# Patient Record
Sex: FEMALE | Race: WHITE | NOT HISPANIC OR LATINO | Employment: FULL TIME | URBAN - METROPOLITAN AREA
[De-identification: names, ages, dates, MRNs, and addresses within clinical notes are randomized per-mention and may not be internally consistent; named-entity substitution may affect disease eponyms.]

---

## 2017-10-13 ENCOUNTER — GENERIC CONVERSION - ENCOUNTER (OUTPATIENT)
Dept: OTHER | Facility: OTHER | Age: 47
End: 2017-10-13

## 2018-01-22 VITALS
DIASTOLIC BLOOD PRESSURE: 64 MMHG | SYSTOLIC BLOOD PRESSURE: 96 MMHG | RESPIRATION RATE: 18 BRPM | HEART RATE: 94 BPM | OXYGEN SATURATION: 99 % | TEMPERATURE: 98.7 F

## 2023-06-29 ENCOUNTER — OFFICE VISIT (OUTPATIENT)
Dept: GASTROENTEROLOGY | Facility: CLINIC | Age: 53
End: 2023-06-29
Payer: COMMERCIAL

## 2023-06-29 VITALS
WEIGHT: 138 LBS | OXYGEN SATURATION: 95 % | HEART RATE: 70 BPM | SYSTOLIC BLOOD PRESSURE: 104 MMHG | HEIGHT: 65 IN | BODY MASS INDEX: 22.99 KG/M2 | DIASTOLIC BLOOD PRESSURE: 78 MMHG

## 2023-06-29 DIAGNOSIS — R10.12 LUQ PAIN: ICD-10-CM

## 2023-06-29 DIAGNOSIS — R10.84 GENERALIZED POSTPRANDIAL ABDOMINAL PAIN: Primary | ICD-10-CM

## 2023-06-29 PROCEDURE — 99204 OFFICE O/P NEW MOD 45 MIN: CPT | Performed by: PHYSICIAN ASSISTANT

## 2023-06-29 RX ORDER — OMEPRAZOLE 40 MG/1
40 CAPSULE, DELAYED RELEASE ORAL DAILY
Qty: 30 CAPSULE | Refills: 3 | Status: SHIPPED | OUTPATIENT
Start: 2023-06-29

## 2023-06-29 RX ORDER — ESTRADIOL 0.1 MG/G
CREAM VAGINAL
COMMUNITY
Start: 2023-06-21

## 2023-06-29 RX ORDER — ALPRAZOLAM 0.25 MG/1
0.25 TABLET ORAL DAILY PRN
COMMUNITY
Start: 2023-05-23

## 2023-06-29 NOTE — PROGRESS NOTES
[facility] - Outpatient Consultation  James Farah 52 y o  female MRN: [id number]  Encounter: [id number]          ASSESSMENT AND PLAN:      #1   Recent onset (~ 1 week) of generalized postprandial abdominal pain, and constant left upper quadrant abdominal pain, correlated with nausea, bloating and belching  Broad differential at this point but primary diagnostic considerations at this point are peptic ulcer disease/gastritis with or without H  pylori infection, or gallbladder disease  Functional dyspepsia/IBS with bowel spasm are also in the differential    -We will check stool H  pylori antigen    -Advised patient that after she submits stool sample, can start omeprazole 40 mg once daily, I sent prescription to her pharmacy    -Advised patient about avoiding gastric irritants such as alcohol, NSAIDs, acidic and caffeinated beverages    -We will also check a right upper quadrant ultrasound    -Plan for office follow-up in the next couple of months; pending her clinical course with PPI therapy and empiric treatment for peptic ulcer disease, we can consider if EGD for further evaluation is warranted    -Patient also reports she had a couple of polyps removed on colonoscopy earlier this month at [facility]; we will have our staff obtain these records and determine recommendations for colorectal cancer screening moving forward  ______________________________________________________________________    HPI: 66-year-old female with history of vertigo who presents for evaluation, she says that for the last week she had been having fairly consistent symptoms of left upper quadrant pain and cramping, and also experiencing difficulty tolerating food intake, she says that relatively promptly after starting to eat she will experience more generalized abdominal pain, often with bloating and nausea  She says these problems started gradually over the course of a few days    She denies any disturbances to her bowel habits over the course of this, no significant diarrhea, and denies any rectal bleeding or melena  She says she has not really had issues like this in the past, she just had a colonoscopy at the beginning of this month at [facility] but does not follow GI there normally as this was done mainly for screening purposes  She tells me this exam saw the removal of 2 benign polyps  She has never had an EGD  Does not know of any history of gallbladder problems and has never had a cholecystectomy  She says she drinks alcohol moderately and averages about 7 alcoholic beverages a week  Denies use of NSAIDs  Denies any recent travel or any recent infections requiring antibiotics use in the last couple months  She does not know of any family history of colon cancer specifically but she says her mother passed away from a diffuse cancer of uncertain origin  REVIEW OF SYSTEMS:     CONSTITUTIONAL: Denies any fever, chills, rigors, and weight loss  HEENT: No earache or tinnitus  Denies hearing loss or visual disturbances  CARDIOVASCULAR: No chest pain or palpitations  RESPIRATORY: Denies any cough, hemoptysis, shortness of breath or dyspnea on exertion  GASTROINTESTINAL: As noted in the History of Present Illness  GENITOURINARY: No problems with urination  Denies any hematuria or dysuria  NEUROLOGIC: No dizziness or vertigo, denies headaches  MUSCULOSKELETAL: Denies any muscle or joint pain  SKIN: Denies skin rashes or itching  ENDOCRINE: Denies excessive thirst  Denies intolerance to heat or cold  PSYCHOSOCIAL: Denies depression or anxiety  Denies any recent memory loss         Historical Information   Past Medical History:   Diagnosis Date   • Vertigo      Past Surgical History:   Procedure Laterality Date   • COLONOSCOPY  06/2023    with Gin     Social History   Social History     Substance and Sexual Activity   Alcohol Use Yes    Comment: socially     Social History     Substance "and Sexual Activity   Drug Use Yes   • Types: Marijuana    Comment: medical card     Social History     Tobacco Use   Smoking Status Never   Smokeless Tobacco Never     Family History   Problem Relation Age of Onset   • Cancer Mother    • Stomach cancer Mother        Meds/Allergies       Current Outpatient Medications:   •  ALPRAZolam (XANAX) 0 25 mg tablet  •  estradiol (ESTRACE) 0 1 mg/g vaginal cream    Allergies   Allergen Reactions   • Ampicillin Hives           Objective     Height 5' 5\" (1 651 m), weight 62 6 kg (138 lb)  Body mass index is 22 96 kg/m²  PHYSICAL EXAM:      General Appearance:   Alert, cooperative, no distress   HEENT:   Normocephalic, atraumatic, anicteric      Neck:  Supple, symmetrical, trachea midline   Lungs:   Clear to auscultation bilaterally; no rales, rhonchi or wheezing; respirations unlabored    Heart[de-identified]   Regular rate and rhythm; no murmur, rub, or gallop  Abdomen:   Soft, non-tender, non-distended; normal bowel sounds; no masses, no organomegaly    Genitalia:   Deferred    Rectal:   Deferred    Extremities:  No cyanosis, clubbing or edema    Pulses:  2+ and symmetric    Skin:  No jaundice, rashes, or lesions    Lymph nodes:  No palpable cervical lymphadenopathy        Lab Results:   No visits with results within 1 Day(s) from this visit  Latest known visit with results is:   No results found for any previous visit  Radiology Results:   No results found      "

## 2023-07-17 ENCOUNTER — OFFICE VISIT (OUTPATIENT)
Dept: OBGYN CLINIC | Facility: CLINIC | Age: 53
End: 2023-07-17
Payer: COMMERCIAL

## 2023-07-17 VITALS
HEIGHT: 65 IN | BODY MASS INDEX: 23.32 KG/M2 | DIASTOLIC BLOOD PRESSURE: 80 MMHG | WEIGHT: 140 LBS | SYSTOLIC BLOOD PRESSURE: 122 MMHG

## 2023-07-17 DIAGNOSIS — N76.0 ACUTE VAGINITIS: Primary | ICD-10-CM

## 2023-07-17 DIAGNOSIS — N95.1 VAGINAL DRYNESS, MENOPAUSAL: ICD-10-CM

## 2023-07-17 PROCEDURE — 87480 CANDIDA DNA DIR PROBE: CPT | Performed by: PHYSICIAN ASSISTANT

## 2023-07-17 PROCEDURE — 87510 GARDNER VAG DNA DIR PROBE: CPT | Performed by: PHYSICIAN ASSISTANT

## 2023-07-17 PROCEDURE — 87660 TRICHOMONAS VAGIN DIR PROBE: CPT | Performed by: PHYSICIAN ASSISTANT

## 2023-07-17 PROCEDURE — 99203 OFFICE O/P NEW LOW 30 MIN: CPT | Performed by: PHYSICIAN ASSISTANT

## 2023-07-17 RX ORDER — PARSLEY 450 MG
CAPSULE ORAL 2 TIMES DAILY
COMMUNITY

## 2023-07-17 NOTE — PATIENT INSTRUCTIONS
Use tea soaks for irritation. Use Estrace vaginal cream consistently for full effect. Call if symptoms worsen or change.

## 2023-07-17 NOTE — PROGRESS NOTES
Assessment/Plan:    No problem-specific Assessment & Plan notes found for this encounter. Diagnoses and all orders for this visit:    Acute vaginitis  -     VAGINOSIS DNA PROBE (AFFIRM)    Vaginal dryness, menopausal    Other orders  -     Ashwagandha 500 MG CAPS; Take by mouth 2 (two) times a day        Vaginal cultures done. We will call with results and treat as necessary. Can use tea soaks for irritation. Stressed the need to use Estrace vaginal cream consistently for full effect. Will f/u for menopause program, as well as annual visit. Call if symptoms worsen or change. Subjective:      Patient ID: Jaqueline Blackburn is a 52 y.o. female. Patient is here with complaint of possible vaginal infection. She is new to our office today. Has been following with her PCP for gyn care. Stopped getting a period 10 years ago. States she has had vaginal itching and irritation for the last few days. Has tried boric acid suppositories without relief. Was prescribed Estrace vaginal cream by her PCP in June for vaginal dryness, but has only been using it sporadically. Patient denies urinary symptoms, vaginal discharge, odor, burning, pelvic pain, abdominal pain, n/v, and fever/chills. She is only occasionally sexually active with her . Complains of menopausal weight gain; has been taking ashwaganda for hot flashes which seems to be helping. Due for Pap. The following portions of the patient's history were reviewed and updated as appropriate: allergies, current medications, past family history, past medical history, past social history, past surgical history and problem list.    Review of Systems   Constitutional: Negative for chills and fever. Gastrointestinal: Negative for abdominal distention, abdominal pain, nausea and vomiting. Genitourinary: Positive for dyspareunia.  Negative for difficulty urinating, dysuria, frequency, genital sores, hematuria, menstrual problem, pelvic pain, urgency, vaginal bleeding, vaginal discharge and vaginal pain. Vaginal itching and irritation         Objective:      /80 (BP Location: Left arm, Patient Position: Sitting, Cuff Size: Adult)   Ht 5' 5" (1.651 m)   Wt 63.5 kg (140 lb)   LMP  (LMP Unknown)   BMI 23.30 kg/m²          Physical Exam  Vitals reviewed. Exam conducted with a chaperone present. Constitutional:       Appearance: Normal appearance. She is well-developed and normal weight. Genitourinary:     General: Normal vulva. Pubic Area: No rash. Labia:         Right: No rash, tenderness, lesion or injury. Left: No rash, tenderness, lesion or injury. Vagina: Erythema and tenderness present. No vaginal discharge or bleeding. Cervix: Normal.      Uterus: Normal.       Adnexa: Right adnexa normal and left adnexa normal.        Right: No mass, tenderness or fullness. Left: No mass, tenderness or fullness. Lymphadenopathy:      Lower Body: No right inguinal adenopathy. No left inguinal adenopathy. Skin:     General: Skin is warm and dry. Neurological:      Mental Status: She is alert and oriented to person, place, and time. Psychiatric:         Mood and Affect: Mood normal.         Behavior: Behavior normal. Behavior is cooperative. Thought Content:  Thought content normal.         Judgment: Judgment normal.

## 2023-07-18 LAB
CANDIDA RRNA VAG QL PROBE: NEGATIVE
G VAGINALIS RRNA GENITAL QL PROBE: POSITIVE
T VAGINALIS RRNA GENITAL QL PROBE: NEGATIVE

## 2023-07-19 ENCOUNTER — TELEPHONE (OUTPATIENT)
Dept: OBGYN CLINIC | Facility: CLINIC | Age: 53
End: 2023-07-19

## 2023-07-19 DIAGNOSIS — N76.0 BV (BACTERIAL VAGINOSIS): Primary | ICD-10-CM

## 2023-07-19 DIAGNOSIS — B96.89 BV (BACTERIAL VAGINOSIS): Primary | ICD-10-CM

## 2023-07-19 RX ORDER — METRONIDAZOLE 500 MG/1
500 TABLET ORAL EVERY 12 HOURS SCHEDULED
Qty: 14 TABLET | Refills: 0 | Status: SHIPPED | OUTPATIENT
Start: 2023-07-19 | End: 2023-07-26

## 2023-07-19 NOTE — TELEPHONE ENCOUNTER
Pt would like to know what her test results were from Monday she is starting to having some itching and wants to get started on medication if her results were positive for infection.

## 2023-08-10 ENCOUNTER — CONSULT (OUTPATIENT)
Dept: OBGYN CLINIC | Facility: CLINIC | Age: 53
End: 2023-08-10
Payer: COMMERCIAL

## 2023-08-10 VITALS
DIASTOLIC BLOOD PRESSURE: 62 MMHG | SYSTOLIC BLOOD PRESSURE: 122 MMHG | WEIGHT: 139.8 LBS | HEIGHT: 65 IN | BODY MASS INDEX: 23.29 KG/M2

## 2023-08-10 DIAGNOSIS — R53.83 OTHER FATIGUE: ICD-10-CM

## 2023-08-10 DIAGNOSIS — R63.5 WEIGHT GAIN: Primary | ICD-10-CM

## 2023-08-10 PROCEDURE — 99213 OFFICE O/P EST LOW 20 MIN: CPT | Performed by: OBSTETRICS & GYNECOLOGY

## 2023-08-10 NOTE — PROGRESS NOTES
Assessment/Plan:     There are no diagnoses linked to this encounter. 57-year-old female  Post menopause  Weight gain  Low energy  Hot flashes/night sweats controlled with ashwagandha  Vaginal dryness  Plan  Continue vaginal Estrace/oil-based moisturizer  Management for hot flashes/night sweats reviewed and discussed with patient started ashwagandha 2 weeks ago help with her symptoms desire to continue current medication  Diet/exercise discussed with patient  Refer to weight management  Return to office in 3 months for follow-up    Subjective:      Patient ID: Alejo Jaffe is a 52 y.o. female.     HPI     Perimenopause at age 37 then 48 menopause  Here today sec to menopasual symptoms    Weight gain  15 lb since menopause unable to loose weight   Patient is eating same diet but not able to lose weight she lost 2 pounds and gained them back  Recommend weight management consult with nutrition to discuss management option and possible medical management      Low energy consider labs to check for any abnormality and patient to start taking multivitamin daily and exercise 50 minutes 3 times a week          HOT FLASHES 5 per day decrease after Ashawganda , night sweat improve  And has bio T pellet in the past and did well with the she has it twice management option for hot flashes and night sweats reviewed and discussed with patient FDA approved medication versus bioidentical hormone therapy reviewed and discussed with patient patient said her symptoms improve when she take ashwagandha and desires to continue        Started  taking vaginal estrace for vaginal dryness  Instructed to add oil-based moisturizer and to continue Estrace 2-3 times weekly        The following portions of the patient's history were reviewed and updated as appropriate: allergies, current medications, past family history, past medical history, past social history, past surgical history and problem list.    Review of Systems Objective:      /62 (BP Location: Left arm, Patient Position: Sitting, Cuff Size: Adult)   Ht 5' 5" (1.651 m)   Wt 63.4 kg (139 lb 12.8 oz)   LMP  (LMP Unknown)   BMI 23.26 kg/m²          Physical Exam  Constitutional:       Appearance: Normal appearance. Neurological:      General: No focal deficit present. Mental Status: She is alert and oriented to person, place, and time.    Psychiatric:         Mood and Affect: Mood normal.         Behavior: Behavior normal.

## 2023-09-18 DIAGNOSIS — R53.83 FATIGUE, UNSPECIFIED TYPE: ICD-10-CM

## 2023-09-18 DIAGNOSIS — R63.5 WEIGHT GAIN: Primary | ICD-10-CM

## 2024-12-13 ENCOUNTER — TRANSCRIBE ORDERS (OUTPATIENT)
Dept: LAB | Facility: CLINIC | Age: 54
End: 2024-12-13

## 2024-12-13 ENCOUNTER — APPOINTMENT (OUTPATIENT)
Dept: LAB | Facility: CLINIC | Age: 54
End: 2024-12-13
Payer: COMMERCIAL

## 2024-12-13 DIAGNOSIS — N95.9 MENOPAUSAL AND POSTMENOPAUSAL DISORDER: ICD-10-CM

## 2024-12-13 DIAGNOSIS — N95.9 MENOPAUSAL PROBLEM: Primary | ICD-10-CM

## 2024-12-13 LAB — FSH SERPL-ACNC: 16.2 MIU/ML

## 2024-12-13 PROCEDURE — 84402 ASSAY OF FREE TESTOSTERONE: CPT

## 2024-12-13 PROCEDURE — 36415 COLL VENOUS BLD VENIPUNCTURE: CPT

## 2024-12-13 PROCEDURE — 83001 ASSAY OF GONADOTROPIN (FSH): CPT

## 2024-12-13 PROCEDURE — 82670 ASSAY OF TOTAL ESTRADIOL: CPT

## 2024-12-13 PROCEDURE — 84403 ASSAY OF TOTAL TESTOSTERONE: CPT

## 2024-12-16 LAB
ESTRADIOL SERPL-MCNC: 32.4 PG/ML
TESTOST FREE SERPL-MCNC: 0.4 PG/ML (ref 0–4.2)
TESTOST SERPL-MCNC: 7 NG/DL (ref 4–50)